# Patient Record
Sex: FEMALE | URBAN - METROPOLITAN AREA
[De-identification: names, ages, dates, MRNs, and addresses within clinical notes are randomized per-mention and may not be internally consistent; named-entity substitution may affect disease eponyms.]

---

## 2018-01-06 ENCOUNTER — EMERGENCY (EMERGENCY)
Facility: HOSPITAL | Age: 68
LOS: 1 days | Discharge: LEFT BEFORE DISCHARGE | End: 2018-01-06
Attending: EMERGENCY MEDICINE

## 2018-01-06 VITALS
SYSTOLIC BLOOD PRESSURE: 171 MMHG | RESPIRATION RATE: 16 BRPM | HEART RATE: 88 BPM | HEIGHT: 61.5 IN | DIASTOLIC BLOOD PRESSURE: 100 MMHG | WEIGHT: 134.92 LBS | OXYGEN SATURATION: 98 % | TEMPERATURE: 97 F

## 2018-01-06 DIAGNOSIS — Z53.21 PROCEDURE AND TREATMENT NOT CARRIED OUT DUE TO PATIENT LEAVING PRIOR TO BEING SEEN BY HEALTH CARE PROVIDER: ICD-10-CM

## 2018-01-06 DIAGNOSIS — R05 COUGH: ICD-10-CM
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
## 2018-01-06 DIAGNOSIS — T17.228A FOOD IN PHARYNX CAUSING OTHER INJURY, INITIAL ENCOUNTER: ICD-10-CM

## 2018-01-06 NOTE — ED ADULT TRIAGE NOTE - CHIEF COMPLAINT QUOTE
C/o" I am coughing, I ate bread with seeds, and I feel like the seed is caught in my throat". No respiratory distress reported/ noted

## 2018-01-06 NOTE — ED ADULT NURSE NOTE - CAS ED LWBS REASON YN
PATIENT CALLED X4 IN ED WAITING ROOM ,NO ANSWER, UNABLE TO CALL PATIENT WAS NOT REGISTERED,  AWARE ,PATIENT LEFT WITHOUT NOTIFYINGED NURSE OR  , PATIENT WAS NOT SEEN BY THIS RN OR .. PATIENT CALLED X4 IN ED WAITING ROOM ,NO ANSWER, UNABLE TO CALL PATIENT WAS NOT REGISTERED,  AWARE ,PATIENT LEFT WITHOUT NOTIFYING ED NURSE OR  , PATIENT WAS NOT SEEN BY THIS RN OR ..